# Patient Record
Sex: FEMALE | URBAN - METROPOLITAN AREA
[De-identification: names, ages, dates, MRNs, and addresses within clinical notes are randomized per-mention and may not be internally consistent; named-entity substitution may affect disease eponyms.]

---

## 2019-12-19 ENCOUNTER — APPOINTMENT (RX ONLY)
Dept: URBAN - METROPOLITAN AREA CLINIC 38 | Facility: CLINIC | Age: 74
Setting detail: DERMATOLOGY
End: 2019-12-19

## 2019-12-19 DIAGNOSIS — L98.8 OTHER SPECIFIED DISORDERS OF THE SKIN AND SUBCUTANEOUS TISSUE: ICD-10-CM

## 2019-12-19 PROBLEM — J44.9 CHRONIC OBSTRUCTIVE PULMONARY DISEASE, UNSPECIFIED: Status: ACTIVE | Noted: 2019-12-19

## 2019-12-19 PROBLEM — M12.9 ARTHROPATHY, UNSPECIFIED: Status: ACTIVE | Noted: 2019-12-19

## 2019-12-19 PROCEDURE — 99202 OFFICE O/P NEW SF 15 MIN: CPT

## 2019-12-19 NOTE — HPI: COSMETIC CONSULTATION
When Outside In The Sun, Do You...: mostly tans, rarely burns
Additional History: Pt has never had cosmetics before but uses otc anti aging creams.

## 2019-12-19 NOTE — HPI: FREE FORM (INITIAL HISTORY)
How Severe Is Your Skin Condition? (The Patient Describes The Severity Level As....): severe
What Brings You In Today? (This Is An Xx Year Old Patient Who Presents For...): Wrinkles
When Did You First Notice It? (The Patient First Noticed It...): Years ago
Where On Your Body Is It? (Located On...): Face
Any Associated Symptoms? (The Symptoms Include.....): worsening with age
What Happened Since That Time? (Since That Time...): Patient has been applying otc anti-aging creams.